# Patient Record
Sex: MALE | Race: WHITE | Employment: OTHER | URBAN - NONMETROPOLITAN AREA
[De-identification: names, ages, dates, MRNs, and addresses within clinical notes are randomized per-mention and may not be internally consistent; named-entity substitution may affect disease eponyms.]

---

## 2018-03-20 DIAGNOSIS — R21 RASH: ICD-10-CM

## 2018-03-20 RX ORDER — TRIAMCINOLONE ACETONIDE 1 MG/G
CREAM TOPICAL
Qty: 60 G | Refills: 1 | Status: SHIPPED | OUTPATIENT
Start: 2018-03-20 | End: 2018-03-27

## 2018-03-20 NOTE — TELEPHONE ENCOUNTER
"Requested Prescriptions   Pending Prescriptions Disp Refills     triamcinolone (KENALOG) 0.1 % cream 60 g 1    Topical Steroid Protocol Failed    3/20/2018  9:17 AM       Failed - Recent (12 mo) or future (30 days) visit within the authorizing provider's specialty    Patient had office visit in the last 12 months or has a visit in the next 30 days with authorizing provider or within the authorizing provider's specialty.  See \"Patient Info\" tab in inbasket, or \"Choose Columns\" in Meds & Orders section of the refill encounter.           Passed - Patient is age 6 or older       Passed - Authorizing prescriber's most recent note related to this medication read.       Passed - High potency steroid not ordered        Kenalog      Last Written Prescription Date:  12/1/14  Last Fill Quantity: 60,   # refills: 1  Last Office Visit: 9/26/16  Future Office visit:       Routing refill request to provider for review/approval because:  Drug not active on patient's medication list    "

## 2018-03-27 ENCOUNTER — OFFICE VISIT (OUTPATIENT)
Dept: FAMILY MEDICINE | Facility: OTHER | Age: 58
End: 2018-03-27
Payer: COMMERCIAL

## 2018-03-27 ENCOUNTER — RADIANT APPOINTMENT (OUTPATIENT)
Dept: GENERAL RADIOLOGY | Facility: OTHER | Age: 58
End: 2018-03-27
Attending: PHYSICIAN ASSISTANT
Payer: COMMERCIAL

## 2018-03-27 ENCOUNTER — TELEPHONE (OUTPATIENT)
Dept: FAMILY MEDICINE | Facility: CLINIC | Age: 58
End: 2018-03-27

## 2018-03-27 VITALS
HEIGHT: 74 IN | HEART RATE: 80 BPM | SYSTOLIC BLOOD PRESSURE: 126 MMHG | BODY MASS INDEX: 24.95 KG/M2 | OXYGEN SATURATION: 94 % | TEMPERATURE: 98.1 F | RESPIRATION RATE: 20 BRPM | DIASTOLIC BLOOD PRESSURE: 72 MMHG | WEIGHT: 194.4 LBS

## 2018-03-27 DIAGNOSIS — R68.89 FLU-LIKE SYMPTOMS: ICD-10-CM

## 2018-03-27 DIAGNOSIS — J20.9 ACUTE BRONCHITIS, UNSPECIFIED ORGANISM: Primary | ICD-10-CM

## 2018-03-27 DIAGNOSIS — R68.83 CHILLS: ICD-10-CM

## 2018-03-27 DIAGNOSIS — R05.9 COUGH: ICD-10-CM

## 2018-03-27 DIAGNOSIS — Z20.818 STREP THROAT EXPOSURE: ICD-10-CM

## 2018-03-27 LAB
DEPRECATED S PYO AG THROAT QL EIA: NORMAL
FLUAV+FLUBV AG SPEC QL: NEGATIVE
FLUAV+FLUBV AG SPEC QL: NEGATIVE
SPECIMEN SOURCE: NORMAL
SPECIMEN SOURCE: NORMAL

## 2018-03-27 PROCEDURE — 99214 OFFICE O/P EST MOD 30 MIN: CPT | Performed by: PHYSICIAN ASSISTANT

## 2018-03-27 PROCEDURE — 87880 STREP A ASSAY W/OPTIC: CPT | Performed by: PHYSICIAN ASSISTANT

## 2018-03-27 PROCEDURE — 87081 CULTURE SCREEN ONLY: CPT | Performed by: PHYSICIAN ASSISTANT

## 2018-03-27 PROCEDURE — 87804 INFLUENZA ASSAY W/OPTIC: CPT | Performed by: PHYSICIAN ASSISTANT

## 2018-03-27 PROCEDURE — 71046 X-RAY EXAM CHEST 2 VIEWS: CPT | Mod: FY

## 2018-03-27 RX ORDER — AZITHROMYCIN 250 MG/1
TABLET, FILM COATED ORAL
Qty: 6 TABLET | Refills: 0 | Status: SHIPPED | OUTPATIENT
Start: 2018-03-27 | End: 2020-10-07

## 2018-03-27 RX ORDER — CODEINE PHOSPHATE AND GUAIFENESIN 10; 100 MG/5ML; MG/5ML
1-2 SOLUTION ORAL EVERY 6 HOURS PRN
Qty: 120 ML | Refills: 0 | Status: SHIPPED | OUTPATIENT
Start: 2018-03-27 | End: 2020-10-07

## 2018-03-27 RX ORDER — METHYLPREDNISOLONE 4 MG
TABLET, DOSE PACK ORAL
Qty: 21 TABLET | Refills: 0 | Status: SHIPPED | OUTPATIENT
Start: 2018-03-27 | End: 2020-10-07

## 2018-03-27 ASSESSMENT — ANXIETY QUESTIONNAIRES
3. WORRYING TOO MUCH ABOUT DIFFERENT THINGS: NOT AT ALL
GAD7 TOTAL SCORE: 2
2. NOT BEING ABLE TO STOP OR CONTROL WORRYING: NOT AT ALL
1. FEELING NERVOUS, ANXIOUS, OR ON EDGE: SEVERAL DAYS
5. BEING SO RESTLESS THAT IT IS HARD TO SIT STILL: NOT AT ALL
6. BECOMING EASILY ANNOYED OR IRRITABLE: NOT AT ALL
IF YOU CHECKED OFF ANY PROBLEMS ON THIS QUESTIONNAIRE, HOW DIFFICULT HAVE THESE PROBLEMS MADE IT FOR YOU TO DO YOUR WORK, TAKE CARE OF THINGS AT HOME, OR GET ALONG WITH OTHER PEOPLE: NOT DIFFICULT AT ALL
7. FEELING AFRAID AS IF SOMETHING AWFUL MIGHT HAPPEN: NOT AT ALL

## 2018-03-27 ASSESSMENT — PAIN SCALES - GENERAL: PAINLEVEL: MILD PAIN (2)

## 2018-03-27 ASSESSMENT — PATIENT HEALTH QUESTIONNAIRE - PHQ9: 5. POOR APPETITE OR OVEREATING: SEVERAL DAYS

## 2018-03-27 NOTE — PATIENT INSTRUCTIONS

## 2018-03-27 NOTE — NURSING NOTE
"Chief Complaint   Patient presents with     Fever     last night     Generalized Body Aches     last night       Initial /72 (BP Location: Right arm, Patient Position: Chair, Cuff Size: Adult Large)  Pulse 80  Temp 98.1  F (36.7  C) (Oral)  Resp 20  Ht 6' 1.5\" (1.867 m)  Wt 194 lb 6.4 oz (88.2 kg)  SpO2 94%  BMI 25.3 kg/m2 Estimated body mass index is 25.3 kg/(m^2) as calculated from the following:    Height as of this encounter: 6' 1.5\" (1.867 m).    Weight as of this encounter: 194 lb 6.4 oz (88.2 kg).  Medication Reconciliation: mercedes STEVENSON LPN      "

## 2018-03-27 NOTE — TELEPHONE ENCOUNTER
"Reason for call:  Patient reporting a symptom    Symptom or request: Patient wants to discuss symptoms with a nurse to see if he should be seen or not, sore throat, fever, deep cough    Duration (how long have symptoms been present): \"started last night at midnight, hit like a ton of bricks\"    Have you been treated for this before? No    Additional comments:     Phone Number patient can be reached at:  Home number on file [phone] (home)    Best Time:      Can we leave a detailed message on this number:  YES    Call taken on 3/27/2018 at 11:40 AM by Lottie Browning    "

## 2018-03-27 NOTE — TELEPHONE ENCOUNTER
": 1960  PHONE #'s: [phone] (home)     PRESENTING PROBLEM:  Came down with shivers at midnight. Heavy cough. Body aches. HA. Neck feels sores. Also nauseated.  \" It has been 25 years since I have been sick  I am still in bed, I am so cold. .\"    NURSING ASSESSMENT  Description:  I cough so hard, I gagged at times.   Onset/duration:  Started last night at midnight.   Precip. factors:  Was around a grandson this past weekend that was Dx with strep.   Assoc. Sx:   Decreased appetite  Improves/worsens Sx:  Same   Pain scale (1-10)   3/10  Sx specific meds:  Ibuprofen 600 mg  Last exam/Tx:  Has NOT been seen for this.     RECOMMENDED DISPOSITION:  See in 24 hours -  Cornelia schedule with Berta Tse at 2 pm today per her OK  Will comply with recommendation: YES  If further questions/concerns or if Sx do not improve, worsen or new Sx develop, call your PCP or [facility] as soon as possible.    NOTES:  Disposition was determined by the first positive assessment question, therefore all previous assessment questions were negative.  Informed to check provider manual or call insurance company to assure coverage.    Guideline used: Fever, Adult/ Influenza  Telephone Triage Protocols for Nurses, Fifth Edition, Veronica Rondon RN  2018    "

## 2018-03-27 NOTE — MR AVS SNAPSHOT
After Visit Summary   3/27/2018    Sherwin Joshi    MRN: [id number]           Patient Information     Date Of Birth          1960        Visit Information        Provider Department      3/27/2018 3:40 PM Berta Tse PA-C [facility]        Today's Diagnoses     Acute bronchitis, unspecified organism    -  1    Flu-like symptoms        Strep throat exposure        Cough        Chills          Care Instructions      Acute Bronchitis  Your healthcare provider has told you that you have acute bronchitis. Bronchitis is infection or inflammation of the bronchial tubes (airways in the lungs). Normally, air moves easily in and out of the airways. Bronchitis narrows the airways, making it harder for air to flow in and out of the lungs. This causes symptoms such as shortness of breath, coughing up yellow or green mucus, and wheezing. Bronchitis can be acute or chronic. Acute means the condition comes on quickly and goes away in a short time, usually within 3 to 10 days. Chronic means a condition lasts a long time and often comes back.    What causes acute bronchitis?  Acute bronchitis almost always starts as a viral respiratory infection, such as a cold or the flu. Certain factors make it more likely for a cold or flu to turn into bronchitis. These include being very young, being elderly, having a heart or lung problem, or having a weak immune system. Cigarette smoking also makes bronchitis more likely.  When bronchitis develops, the airways become swollen. The airways may also become infected with bacteria. This is known as a secondary infection.  Diagnosing acute bronchitis  Your healthcare provider will examine you and ask about your symptoms and health history. You may also have a sputum culture to test the fluid in your lungs. Chest X-rays may be done to look for infection in the lungs.  Treating acute bronchitis  Bronchitis usually clears up as the cold or flu goes away. You can  help feel better faster by doing the following:    Take medicine as directed. You may be told to take ibuprofen or other over-the-counter medicines. These help relieve inflammation in your bronchial tubes. Your healthcare provider may prescribe an inhaler to help open up the bronchial tubes. Most of the time, acute bronchitis is caused by a viral infection. Antibiotics are usually not prescribed for viral infections.    Drink plenty of fluids, such as water, juice, or warm soup. Fluids loosen mucus so that you can cough it up. This helps you breathe more easily. Fluids also prevent dehydration.    Make sure you get plenty of rest.    Do not smoke. Do not allow anyone else to smoke in your home.  Recovery and follow-up  Follow up with your doctor as you are told. You will likely feel better in a week or two. But a dry cough can linger beyond that time. Let your doctor know if you still have symptoms (other than a dry cough) after 2 weeks, or if you re prone to getting bronchial infections. Take steps to protect yourself from future infections. These steps include stopping smoking and avoiding tobacco smoke, washing your hands often, and getting a yearly flu shot.  When to call your healthcare provider  Call the healthcare provider if you have any of the following:    Fever of 100.4 F (38.0 C) or higher, or as advised    Symptoms that get worse, or new symptoms    Trouble breathing    Symptoms that don t start to improve within a week, or within 3 days of taking antibiotics   Date Last Reviewed: 12/1/2016 2000-2017 [employer]. [address] [place] [address], [place] [address]. All rights reserved. This information is not intended as a substitute for professional medical care. Always follow your healthcare professional's instructions.                Follow-ups after your visit        Follow-up notes from your care team     Return if symptoms worsen or fail to improve.      Your next 10 appointments already  "scheduled     Mar 27, 2018  3:40 PM CDT   [facility] with Berta Tse [facility]-C   [facility]    [address] [facility] [id number] [phone]              Who to contact     If you have questions or need follow up information about today's clinic visit or your schedule please contact [facility] directly at [phone].  Normal or non-critical lab and imaging results will be communicated to you by [email or web address], letter or phone within 4 business days after the clinic has received the results. If you do not hear from us within 7 days, please contact the clinic through [email or web address] or phone. If you have a critical or abnormal lab result, we will notify you by phone as soon as possible.  Submit refill requests through [facility] or call your pharmacy and they will forward the refill request to us. Please allow 3 business days for your refill to be completed.          Additional Information About Your Visit        [employer] Information     [employer] lets you send messages to your doctor, view your test results, renew your prescriptions, schedule appointments and more. To sign up, go to [email or web address] . Click on \"Log in\" on the left side of the screen, which will take you to the Welcome page. Then click on \"Sign up Now\" on the right side of the page.     You will be asked to enter the access code listed below, as well as some personal information. Please follow the directions to create your username and password.     Your access code is: [email or web address]  Expires: 2018  2:49 PM     Your access code will  in 90 days. If you need help or a new code, please call your [facility] or [phone].        Care EveryWhere ID     This is your Care EveryWhere ID. This could be used by other organizations to access your [place] medical records  [id number]        Your Vitals Were     Pulse Temperature Respirations Height Pulse Oximetry BMI (Body Mass Index)    80 98.1 " " F (36.7  C) (Oral) 20 6' 1.5\" (1.867 m) 94% 25.3 kg/m2       Blood Pressure from Last 3 Encounters:   03/27/18 126/72   09/26/16 120/80   07/25/14 104/72    Weight from Last 3 Encounters:   03/27/18 194 lb 6.4 oz (88.2 kg)   10/27/16 199 lb (90.3 kg)   10/17/16 199 lb (90.3 kg)              We Performed the Following     Beta strep group A culture     Influenza A/B antigen     Strep, Rapid Screen          Today's Medication Changes          These changes are accurate as of 3/27/18  2:50 PM.  If you have any questions, ask your nurse or doctor.               Start taking these medicines.        Dose/Directions    azithromycin 250 MG tablet   Commonly known as:  ZITHROMAX   Used for:  Acute bronchitis, unspecified organism   Started by:  Berta Tse PA-C        Two tablets first day, then one tablet daily for four days.   Quantity:  6 tablet   Refills:  0       guaiFENesin-codeine 100-10 MG/5ML Soln solution   Commonly known as:  ROBITUSSIN AC   Used for:  Cough   Started by:  Berta Tse PA-C        Dose:  1-2 tsp.   Take 5-10 mLs by mouth every 6 hours as needed for cough   Quantity:  120 mL   Refills:  0       methylPREDNISolone 4 MG tablet   Commonly known as:  MEDROL DOSEPAK   Used for:  Acute bronchitis, unspecified organism   Started by:  Berta Tse PA-C        Follow package instructions   Quantity:  21 tablet   Refills:  0            Where to get your medicines      These medications were sent to [facility] [place], [place] - [address]   [address] [facility] [address]     Phone:  [phone]     azithromycin 250 MG tablet    methylPREDNISolone 4 MG tablet         Some of these will need a paper prescription and others can be bought over the counter.  Ask your nurse if you have questions.     Bring a paper prescription for each of these medications     guaiFENesin-codeine 100-10 MG/5ML Soln solution                Primary Care Provider Office Phone # Fax #    Sherwin CRAFT" MD [place] [phone]       [address] [place] [phone]        Equal Access to Services     LIDYA HILARIO : Hadii aad ku hadzanekenny Arnold, alvinaperla israelgiaha, doc kassiearmando green, isaias sue sairashiva alejandra laKierageraldo hidalgo. So [facility] [phone].    ATENCIÓN: Si habla español, tiene a cueto disposición servicios gratuitos de asistencia lingüística. Llame al [phone].    We comply with applicable federal civil rights laws and [place] laws. We do not discriminate on the basis of race, color, national origin, age, disability, sex, sexual orientation, or gender identity.            Thank you!     Thank you for choosing [facility]  for your care. Our goal is always to provide you with excellent care. Hearing back from our patients is one way we can continue to improve our services. Please take a few minutes to complete the written survey that you may receive in the mail after your visit with us. Thank you!             Your Updated Medication List - Protect others around you: Learn how to safely use, store and throw away your medicines at www.disposemymeds.org.          This list is accurate as of 3/27/18  2:50 PM.  Always use your most recent med list.                   Brand Name Dispense Instructions for use Diagnosis    azithromycin 250 MG tablet    ZITHROMAX    6 tablet    Two tablets first day, then one tablet daily for four days.    Acute bronchitis, unspecified organism       guaiFENesin-codeine 100-10 MG/5ML Soln solution    ROBITUSSIN AC    120 mL    Take 5-10 mLs by mouth every 6 hours as needed for cough    Cough       methylPREDNISolone 4 MG tablet    MEDROL DOSEPAK    21 tablet    Follow package instructions    Acute bronchitis, unspecified organism

## 2018-03-27 NOTE — PROGRESS NOTES
SUBJECTIVE:   Sherwin Joshi is a 57 year old male who presents to clinic today for the following health issues:      Acute Illness   Acute illness concerns: fever and body aches  Onset: last night    Fever: YES    Chills/Sweats: YES    Headache (location?): YES    Sinus Pressure:no    Conjunctivitis:  no    Ear Pain: no    Rhinorrhea: no    Congestion: YES    Sore Throat: no     Cough: YES-productive of green sputum    Wheeze: YES    Decreased Appetite: YES    Nausea: YES    Vomiting: no    Diarrhea:  no    Dysuria/Freq.: no    Fatigue/Achiness: YES    Sick/Strep Exposure: YES     Therapies Tried and outcome: Ibuprofen, slight relief    Patient reports that he started having a cough about a week ago which came in fits a few times but then seemed to resolve. He was exposed to strep through a family member recently but has not had much of a sore throat. He states that last evening he woke feeling chilled and having body aches and a severe cough that was productive of green sputum. He has continued to have symptoms today. Cough is made worse with deep breathing. He has not had any wheezing, he has no history of lung disease.     -------------------------------------    Problem list and histories reviewed & adjusted, as indicated.  Additional history: as documented    BP Readings from Last 3 Encounters:   03/27/18 126/72   09/26/16 120/80   07/25/14 104/72    Wt Readings from Last 3 Encounters:   03/27/18 194 lb 6.4 oz (88.2 kg)   10/27/16 199 lb (90.3 kg)   10/17/16 199 lb (90.3 kg)                    Reviewed and updated as needed this visit by clinical staff  Tobacco  Allergies  Med Hx  Surg Hx  Fam Hx  Soc Hx      Reviewed and updated as needed this visit by Provider         ROS:  Constitutional, HEENT, cardiovascular, pulmonary, gi and gu systems are negative, except as otherwise noted.    OBJECTIVE:     /72 (BP Location: Right arm, Patient Position: Chair, Cuff Size: Adult Large)  Pulse 80  Temp  "98.1  F (36.7  C) (Oral)  Resp 20  Ht 6' 1.5\" (1.867 m)  Wt 194 lb 6.4 oz (88.2 kg)  SpO2 94%  BMI 25.3 kg/m2  Body mass index is 25.3 kg/(m^2).  GENERAL: healthy, alert and no distress  HENT: normal cephalic/atraumatic, ear canals and TM's normal, rhinorrhea yellow, oropharynx clear, oral mucous membranes moist and tonsillar erythema  NECK: bilateral anterior cervical adenopathy  RESP: bronchial breath sounds mild bilaterally  HEART: RRR, no M/R/G  Skin: no rashes or lesions     Diagnostic Test Results:  Results for orders placed or performed in visit on 03/27/18 (from the past 24 hour(s))   Influenza A/B antigen   Result Value Ref Range    Influenza A/B Agn Specimen Nose     Influenza A Negative NEG^Negative    Influenza B Negative NEG^Negative   Strep, Rapid Screen   Result Value Ref Range    Specimen Description Throat     Rapid Strep A Screen       NEGATIVE: No Group A streptococcal antigen detected by immunoassay, await culture report.     Xray-pending     ASSESSMENT/PLAN:       ICD-10-CM    1. Acute bronchitis, unspecified organism J20.9 azithromycin (ZITHROMAX) 250 MG tablet     methylPREDNISolone (MEDROL DOSEPAK) 4 MG tablet   2. Flu-like symptoms R68.89 Influenza A/B antigen     Beta strep group A culture   3. Strep throat exposure Z20.818 Strep, Rapid Screen     CANCELED: Beta strep group A culture   4. Cough R05 XR Chest 2 Views     guaiFENesin-codeine (ROBITUSSIN AC) 100-10 MG/5ML SOLN solution   5. Chills R68.83 XR Chest 2 Views       I will treat for acute bronchitis vs. Early pneumonia with a z pack and a medrol pack as well as some cough syrup. He should follow up if not improving or if new or worsening symptoms.   See Patient Instructions    Berta Tse PA-C  [facility]    "

## 2018-03-28 LAB
BACTERIA SPEC CULT: NORMAL
SPECIMEN SOURCE: NORMAL

## 2018-03-28 ASSESSMENT — PATIENT HEALTH QUESTIONNAIRE - PHQ9: SUM OF ALL RESPONSES TO PHQ QUESTIONS 1-9: 0

## 2018-03-28 ASSESSMENT — ANXIETY QUESTIONNAIRES: GAD7 TOTAL SCORE: 2

## 2019-12-08 ENCOUNTER — HEALTH MAINTENANCE LETTER (OUTPATIENT)
Age: 59
End: 2019-12-08

## 2020-09-08 ENCOUNTER — HOSPITAL ENCOUNTER (EMERGENCY)
Facility: CLINIC | Age: 60
Discharge: HOME | End: 2020-09-08
Attending: EMERGENCY MEDICINE | Admitting: EMERGENCY MEDICINE
Payer: COMMERCIAL

## 2020-09-08 DIAGNOSIS — S01.81XA FACIAL LACERATION, INITIAL ENCOUNTER: ICD-10-CM

## 2020-09-08 PROCEDURE — 99283 EMERGENCY DEPT VISIT LOW MDM: CPT | Mod: 25 | Performed by: EMERGENCY MEDICINE

## 2020-09-08 PROCEDURE — 25000128 H RX IP 250 OP 636: Performed by: EMERGENCY MEDICINE

## 2020-09-08 PROCEDURE — 12013 RPR F/E/E/N/L/M 2.6-5.0 CM: CPT | Mod: Z6 | Performed by: EMERGENCY MEDICINE

## 2020-09-08 PROCEDURE — 12013 RPR F/E/E/N/L/M 2.6-5.0 CM: CPT | Performed by: EMERGENCY MEDICINE

## 2020-09-08 PROCEDURE — 25000125 ZZHC RX 250: Performed by: EMERGENCY MEDICINE

## 2020-09-08 PROCEDURE — 90471 IMMUNIZATION ADMIN: CPT | Performed by: EMERGENCY MEDICINE

## 2020-09-08 PROCEDURE — 90715 TDAP VACCINE 7 YRS/> IM: CPT | Performed by: EMERGENCY MEDICINE

## 2020-09-08 PROCEDURE — 99282 EMERGENCY DEPT VISIT SF MDM: CPT | Mod: 25 | Performed by: EMERGENCY MEDICINE

## 2020-09-08 RX ORDER — LIDOCAINE HYDROCHLORIDE AND EPINEPHRINE 10; 10 MG/ML; UG/ML
5 INJECTION, SOLUTION INFILTRATION; PERINEURAL ONCE
Status: COMPLETED | OUTPATIENT
Start: 2020-09-08 | End: 2020-09-08

## 2020-09-08 RX ADMIN — CLOSTRIDIUM TETANI TOXOID ANTIGEN (FORMALDEHYDE INACTIVATED), CORYNEBACTERIUM DIPHTHERIAE TOXOID ANTIGEN (FORMALDEHYDE INACTIVATED), BORDETELLA PERTUSSIS TOXOID ANTIGEN (GLUTARALDEHYDE INACTIVATED), BORDETELLA PERTUSSIS FILAMENTOUS HEMAGGLUTININ ANTIGEN (FORMALDEHYDE INACTIVATED), BORDETELLA PERTUSSIS PERTACTIN ANTIGEN, AND BORDETELLA PERTUSSIS FIMBRIAE 2/3 ANTIGEN 0.5 ML: 5; 2; 2.5; 5; 3; 5 INJECTION, SUSPENSION INTRAMUSCULAR at 18:02

## 2020-09-08 RX ADMIN — LIDOCAINE HYDROCHLORIDE,EPINEPHRINE BITARTRATE 5 ML: 10; .01 INJECTION, SOLUTION INFILTRATION; PERINEURAL at 17:49

## 2020-09-08 NOTE — ED TRIAGE NOTES
Fell about 1615 today and sustained a laceration above his right eyebrow.   Patient states he fell 8 foot metal roof, landed on his feet and rolled. No complaints of any other injuries. Dr. Vides in room and notified.   Patient's airway, breathing, circulation, and disability/mental status (ABCDs) intact/WDL during triage.

## 2020-09-08 NOTE — ED PROVIDER NOTES
History     Chief Complaint   Patient presents with     Facial Laceration     The history is provided by the patient.     Sherwin Joshi is a 59 year old male who presents to the emergency department for evaluation of a facial laceration. The patient was on a pole barn roof prior to arrival and says he fell off. He fell from about eight feet in the air and landed onto his feet and rolled. He says the ground was soft. A piece of tin flipped up and hit him in the right side of his face when he landed. He says he has some stinging at the site of the laceration, but denies any pain. He was ambulatory after falling and has not developed any pain in his feet or other extremities. He says his tetanus is not up to date.     Allergies:  Allergies   Allergen Reactions     No Known Drug Allergies        Problem List:    Patient Active Problem List    Diagnosis Date Noted     Strain of piriformis muscle, left, initial encounter 10/27/2016     Priority: Medium     Hyperlipidemia LDL goal <130 09/12/2012     Priority: Medium     CARDIOVASCULAR SCREENING; LDL GOAL LESS THAN 160 01/24/2011     Priority: Medium     Molluscum contagiosum 08/08/2007     Priority: Medium     Sprain of ankle 02/01/2002     Priority: Medium     left, work related.  Problem list name updated by automated process. Provider to review          Past Medical History:    History reviewed. No pertinent past medical history.    Past Surgical History:    Past Surgical History:   Procedure Laterality Date     C NONSPECIFIC PROCEDURE      R upper arm Hematoma surgery     C NONSPECIFIC PROCEDURE  1973    ORIF elbow     COLONOSCOPY  10/22/2012    Procedure: COLONOSCOPY;  Colonoscopy;  Surgeon: Chandana Fink MD;  Location:  GI      LAPAROSCOPY, SURGICAL; APPENDECTOMY  04/24/10      REPAIR ING HERNIA,5+Y/O,REDUCIBL  1997    Left inguinal hernia repair with Marlex mesn     HC VASECTOMY UNILAT/BILAT W POSTOP SEMEN  1997    Vasectomy       Family History:     No family history on file.    Social History:  Marital Status:   [2]  Social History     Tobacco Use     Smoking status: Never Smoker     Smokeless tobacco: Never Used   Substance Use Topics     Alcohol use: No     Drug use: No        Medications:    azithromycin (ZITHROMAX) 250 MG tablet  guaiFENesin-codeine (ROBITUSSIN AC) 100-10 MG/5ML SOLN solution  methylPREDNISolone (MEDROL DOSEPAK) 4 MG tablet          Review of Systems   All other systems reviewed and are negative.      Physical Exam   BP: (!) 163/106  Pulse: 60  Temp: 97.9  F (36.6  C)  Resp: 18  Weight: 91.6 kg (201 lb 14.4 oz)  SpO2: 97 %      Physical Exam  Vitals signs and nursing note reviewed.   Constitutional:       General: He is not in acute distress.     Appearance: Normal appearance. He is not diaphoretic.   HENT:      Head: Normocephalic. Laceration (4 cm stellate laceration lateral to the right eye. Eye is not involved.) present.      Comments: No step off or crepitus.       Right Ear: External ear normal.      Left Ear: External ear normal.   Eyes:      Extraocular Movements: Extraocular movements intact.      Conjunctiva/sclera: Conjunctivae normal.      Pupils: Pupils are equal, round, and reactive to light.   Neck:      Musculoskeletal: Normal range of motion and neck supple. No neck rigidity.   Cardiovascular:      Rate and Rhythm: Normal rate and regular rhythm.      Pulses: Normal pulses.      Heart sounds: Normal heart sounds. No murmur.   Pulmonary:      Effort: Pulmonary effort is normal. No respiratory distress.      Breath sounds: Normal breath sounds.   Chest:      Chest wall: No tenderness.   Abdominal:      General: Bowel sounds are normal.      Palpations: Abdomen is soft.      Tenderness: There is no abdominal tenderness. There is no guarding.   Musculoskeletal: Normal range of motion.         General: No deformity.      Cervical back: He exhibits no tenderness.      Thoracic back: He exhibits no tenderness.       Lumbar back: He exhibits no tenderness.      Comments: All extremities move well without signs of injury.   Skin:     General: Skin is warm and dry.      Coloration: Skin is not pale.      Findings: No rash.   Neurological:      Mental Status: He is alert and oriented to person, place, and time.      Motor: No weakness.   Psychiatric:         Thought Content: Thought content normal.         Judgment: Judgment normal.         ED Course        [facility]    -Laceration Repair    Date/Time: 9/8/2020 8:29 PM  Performed by: Bird Vides MD  Authorized by: Bird Vides MD       ANESTHESIA (see MAR for exact dosages):     Anesthesia method:  Local infiltration    Local anesthetic:  Lidocaine 1% WITH epi  LACERATION DETAILS     Location:  Face    Length (cm):  4    REPAIR TYPE:     Repair type:  Intermediate      EXPLORATION:     Wound exploration: wound explored through full range of motion and entire depth of wound probed and visualized      Contaminated: no      TREATMENT:     Area cleansed with:  Saline    SKIN REPAIR     Repair method:  Sutures    Suture size:  5-0    Suture material:  Nylon    Number of sutures:  6    APPROXIMATION     Approximation:  Close    POST-PROCEDURE DETAILS     Dressing:  Antibiotic ointment      PROCEDURE   Patient Tolerance:  Patient tolerated the procedure well with no immediate complications                     Critical Care time:  none               No results found for this or any previous visit (from the past 24 hour(s)).    Medications   Tdap (tetanus-diphtheria-acell pertussis) (ADACEL) injection 0.5 mL (0.5 mLs Intramuscular Given 9/8/20 8192)   lidocaine 1% with EPINEPHrine 1:100,000 injection 5 mL (5 mLs Intradermal Given by Other 9/8/20 8312)       Assessments & Plan (with Medical Decision Making)  59 year old male who presents with concerns of a facial laceration. This occurred prior to arrival after the patient fell about eight feet  from a roof to the ground. Upon arrival he is afebrile. His blood pressure is slightly elevated at 163/106, but he is otherwise in stable condition. Examination revealed a 4 cm stellate laceration lateral to the right eye. The right eye is not involved. He has no other injuries and complains of no pain. All extremities move normally. He is ambulatory.   The patient's laceration was repaired here in the ED. See the dictated procedure above for the full report. His tetanus vaccination was also updated. I explained that he should have the sutures removed in 4-6 days. If he feels comfortable doing this at home he can, otherwise he can follow up in the clinic. He should keep the area clean and dry. He can take Tylenol or ibuprofen for any pain he should have. If he notices any signs of infection we discussed he should return to the ED. The patient is in agreement with this treatment plan and is suitable for discharge in stable condition.      I have reviewed the nursing notes.    I have reviewed the findings, diagnosis, plan and need for follow up with the patient.       Discharge Medication List as of 9/8/2020  6:31 PM          Final diagnoses:   Facial laceration, initial encounter       This document serves as a record of services personally performed by Bird Vides MD. It was created on their behalf by Vanessa Morris, a trained medical scribe. The creation of this record is based on the provider's personal observations and the statements of the patient. This document has been checked and approved by the attending provider.  Note: Chart documentation done in part with Dragon Voice Recognition software. Although reviewed after completion, some word and grammatical errors may remain.  9/8/2020   [facility] EMERGENCY DEPARTMENT     Bird Vides MD  09/08/20 2030

## 2020-09-08 NOTE — ED AVS SNAPSHOT
[facility] Emergency Department  [address] [place] [address]  Phone:  [phone]  Fax:  [phone]                                    Sherwin Joshi   MRN: [id number]    Department:  [facility] Emergency Department   Date of Visit:  9/8/2020           After Visit Summary Signature Page    I have received my discharge instructions, and my questions have been answered. I have discussed any challenges I see with this plan with the nurse or doctor.    ..........................................................................................................................................  Patient/Patient Representative Signature      ..........................................................................................................................................  Patient Representative Print Name and Relationship to Patient    ..................................................               ................................................  Date                                   Time    ..........................................................................................................................................  Reviewed by Signature/Title    ...................................................              ..............................................  Date                                               Time          22EPIC Rev 08/18

## 2020-09-09 VITALS
RESPIRATION RATE: 18 BRPM | WEIGHT: 201.9 LBS | SYSTOLIC BLOOD PRESSURE: 166 MMHG | DIASTOLIC BLOOD PRESSURE: 110 MMHG | HEART RATE: 69 BPM | TEMPERATURE: 97.9 F | OXYGEN SATURATION: 97 % | BODY MASS INDEX: 26.28 KG/M2

## 2020-09-14 ENCOUNTER — ALLIED HEALTH/NURSE VISIT (OUTPATIENT)
Dept: FAMILY MEDICINE | Facility: CLINIC | Age: 60
End: 2020-09-14
Payer: COMMERCIAL

## 2020-09-14 VITALS — DIASTOLIC BLOOD PRESSURE: 62 MMHG | SYSTOLIC BLOOD PRESSURE: 120 MMHG

## 2020-09-14 DIAGNOSIS — Z48.02 VISIT FOR SUTURE REMOVAL: Primary | ICD-10-CM

## 2020-09-14 PROCEDURE — 99207 ZZC NO CHARGE NURSE ONLY: CPT

## 2020-09-14 NOTE — PROGRESS NOTES
Sherwin Joshi presents to the clinic today for removal of sutures.  The patient has had the sutures in place for 6 days.  There has been no history of infection or drainage.  7 sutures are seen located on.  The wound is healing well with no signs of infection.  Tetanus status is up to date.   All sutures were easily removed today.  Routine wound care discussed.  The patient will follow up as needed.    Viki Delacruz RN

## 2020-10-01 ENCOUNTER — TELEPHONE (OUTPATIENT)
Dept: FAMILY MEDICINE | Facility: CLINIC | Age: 60
End: 2020-10-01

## 2020-10-01 NOTE — TELEPHONE ENCOUNTER
"Aroldo is requesting a refill of a cream that he uses on his face, he did not have a bottle to read me the name of the medication, and I could not see \"past\" prescriptions in Epic, only current prescriptions.  Sorry, Hopefully you can find what he is requesting.     Thanks  Lidia Maldonado [facility]   [phone]      "

## 2020-10-07 ENCOUNTER — OFFICE VISIT (OUTPATIENT)
Dept: FAMILY MEDICINE | Facility: CLINIC | Age: 60
End: 2020-10-07
Payer: COMMERCIAL

## 2020-10-07 VITALS
TEMPERATURE: 98.4 F | WEIGHT: 198.4 LBS | SYSTOLIC BLOOD PRESSURE: 134 MMHG | HEIGHT: 73 IN | OXYGEN SATURATION: 98 % | BODY MASS INDEX: 26.29 KG/M2 | HEART RATE: 82 BPM | RESPIRATION RATE: 16 BRPM | DIASTOLIC BLOOD PRESSURE: 80 MMHG

## 2020-10-07 DIAGNOSIS — R21 RASH: ICD-10-CM

## 2020-10-07 PROCEDURE — 99213 OFFICE O/P EST LOW 20 MIN: CPT | Performed by: FAMILY MEDICINE

## 2020-10-07 RX ORDER — TRIAMCINOLONE ACETONIDE 1 MG/G
CREAM TOPICAL
Qty: 60 G | Refills: 1 | Status: SHIPPED | OUTPATIENT
Start: 2020-10-07

## 2020-10-07 ASSESSMENT — MIFFLIN-ST. JEOR: SCORE: 1773.82

## 2020-10-07 ASSESSMENT — PAIN SCALES - GENERAL: PAINLEVEL: NO PAIN (0)

## 2020-10-07 NOTE — NURSING NOTE
Health Maintenance Due   Topic Date Due     HEPATITIS C SCREENING  1960     ADVANCE CARE PLANNING  1960     HIV SCREENING  12/04/1976     ZOSTER IMMUNIZATION (1 of 2) 12/04/2011     PREVENTIVE CARE VISIT  07/25/2015     LIPID  01/07/2020     INFLUENZA VACCINE (1) 09/01/2020     Health Maintenance reviewed at today's visit patient asked to schedule/complete:   Immunizations:  Patient declined     Eric Nolasco, CMA

## 2020-10-07 NOTE — PROGRESS NOTES
"Subjective     Sherwin Joshi is a 59 year old male who presents to clinic today for the following health issues:    HPI         Concern - derm  Onset: comes and goes  Description: red spots on face  Intensity: moderate  Progression of Symptoms:  intermittent  Accompanying Signs & Symptoms: red, itches and burns, flakes  Previous history of similar problem: He has had it for 6 years or so  Precipitating factors:        Worsened by: stress makes it worse (he believes)  Alleviating factors:        Improved by: prescription ( triamcinolone cream)  Therapies tried and outcome: triamcinolone cream from physician    SUBJECTIVE:  Sherwin  is a 59 year old male who presents for: Rash on his face.  He has had this on and off.  Comes and goes.  [place] flakes.  He said it for about 6 years.  Triamcinolone cream seems to work great.  He is out of it now.    No past medical history on file.  Past Surgical History:   Procedure Laterality Date     COLONOSCOPY  10/22/2012    Procedure: COLONOSCOPY;  Colonoscopy;  Surgeon: Chandana Fink MD;  Location:  GI     HC LAPAROSCOPY, SURGICAL; APPENDECTOMY  04/24/10      REPAIR ING HERNIA,5+Y/O,REDUCIBL  1997    Left inguinal hernia repair with Marlex mesn      VASECTOMY UNILAT/BILAT W POSTOP SEMEN  1997    Vasectomy     [facility] NONSPECIFIC PROCEDURE      R upper arm Hematoma surgery     [facility] NONSPECIFIC PROCEDURE  1973    ORIF elbow     Social History     Tobacco Use     Smoking status: Never Smoker     Smokeless tobacco: Never Used   Substance Use Topics     Alcohol use: No     Current Outpatient Medications   Medication Sig Dispense Refill     triamcinolone (KENALOG) 0.1 % external cream APPLY TOPICALLY TWO TIMES A DAY 60 g 1       REVIEW OF SYSTEMS:   5 point ROS negative except as noted above in HPI, including Gen., Resp, CV, GI &  system review.     OBJECTIVE:  Vitals: /80   Pulse 82   Temp 98.4  F (36.9  C) (Temporal)   Resp 16   Ht 1.854 m (6' 1\")   Wt 90 kg (198 " lb 6.4 oz)   SpO2 98%   BMI 26.18 kg/m    BMI= Body mass index is 26.18 kg/m .  He is alert and appears well.  Little bit of redness on the chin below the lips both sides of his face.  Flat red a little bit of scaling.  Not well demarcated.    ASSESSMENT:  Rash    PLAN:  We will treat with triamcinolone 0.1% cream he uses this as needed it seems to clear up after a couple of days when it comes on.  Not sure what brings this on.        Sherwin aAron MD  [facility]

## 2021-01-09 ENCOUNTER — HEALTH MAINTENANCE LETTER (OUTPATIENT)
Age: 61
End: 2021-01-09

## 2021-04-21 ENCOUNTER — IMMUNIZATION (OUTPATIENT)
Dept: FAMILY MEDICINE | Facility: CLINIC | Age: 61
End: 2021-04-21
Payer: COMMERCIAL

## 2021-04-21 PROCEDURE — 0011A PR COVID VAC MODERNA 100 MCG/0.5 ML IM: CPT

## 2021-04-21 PROCEDURE — 91301 PR COVID VAC MODERNA 100 MCG/0.5 ML IM: CPT

## 2021-05-19 ENCOUNTER — IMMUNIZATION (OUTPATIENT)
Dept: FAMILY MEDICINE | Facility: CLINIC | Age: 61
End: 2021-05-19
Attending: FAMILY MEDICINE
Payer: COMMERCIAL

## 2021-05-19 PROCEDURE — 91301 PR COVID VAC MODERNA 100 MCG/0.5 ML IM: CPT

## 2021-05-19 PROCEDURE — 0012A PR COVID VAC MODERNA 100 MCG/0.5 ML IM: CPT

## 2021-10-23 ENCOUNTER — HEALTH MAINTENANCE LETTER (OUTPATIENT)
Age: 61
End: 2021-10-23

## 2022-01-14 ENCOUNTER — IMMUNIZATION (OUTPATIENT)
Dept: FAMILY MEDICINE | Facility: CLINIC | Age: 62
End: 2022-01-14
Payer: COMMERCIAL

## 2022-01-14 PROCEDURE — 0064A COVID-19,PF,MODERNA (18+ YRS BOOSTER .25ML): CPT

## 2022-01-14 PROCEDURE — 91306 COVID-19,PF,MODERNA (18+ YRS BOOSTER .25ML): CPT

## 2022-02-12 ENCOUNTER — HEALTH MAINTENANCE LETTER (OUTPATIENT)
Age: 62
End: 2022-02-12

## 2022-10-09 ENCOUNTER — HEALTH MAINTENANCE LETTER (OUTPATIENT)
Age: 62
End: 2022-10-09

## 2023-03-25 ENCOUNTER — HEALTH MAINTENANCE LETTER (OUTPATIENT)
Age: 63
End: 2023-03-25

## 2024-08-03 ENCOUNTER — HEALTH MAINTENANCE LETTER (OUTPATIENT)
Age: 64
End: 2024-08-03

## 2025-07-24 ENCOUNTER — OFFICE VISIT (OUTPATIENT)
Dept: FAMILY MEDICINE | Facility: OTHER | Age: 65
End: 2025-07-24
Payer: COMMERCIAL

## 2025-07-24 VITALS
HEIGHT: 74 IN | OXYGEN SATURATION: 96 % | WEIGHT: 212.5 LBS | TEMPERATURE: 97.8 F | HEART RATE: 66 BPM | DIASTOLIC BLOOD PRESSURE: 88 MMHG | SYSTOLIC BLOOD PRESSURE: 126 MMHG | BODY MASS INDEX: 27.27 KG/M2 | RESPIRATION RATE: 16 BRPM

## 2025-07-24 DIAGNOSIS — N52.9 ERECTILE DYSFUNCTION, UNSPECIFIED ERECTILE DYSFUNCTION TYPE: ICD-10-CM

## 2025-07-24 DIAGNOSIS — Z56.9 WORK ENVIRONMENT ADVERSE EFFECT: ICD-10-CM

## 2025-07-24 DIAGNOSIS — R35.0 BENIGN PROSTATIC HYPERPLASIA WITH URINARY FREQUENCY: ICD-10-CM

## 2025-07-24 DIAGNOSIS — R21 RASH: ICD-10-CM

## 2025-07-24 DIAGNOSIS — L98.9 SKIN LESION: ICD-10-CM

## 2025-07-24 DIAGNOSIS — Z00.00 ROUTINE GENERAL MEDICAL EXAMINATION AT A HEALTH CARE FACILITY: Primary | ICD-10-CM

## 2025-07-24 DIAGNOSIS — Z12.5 SCREENING FOR PROSTATE CANCER: ICD-10-CM

## 2025-07-24 DIAGNOSIS — Z12.11 SCREEN FOR COLON CANCER: ICD-10-CM

## 2025-07-24 DIAGNOSIS — E78.5 HYPERLIPIDEMIA LDL GOAL <130: ICD-10-CM

## 2025-07-24 DIAGNOSIS — N40.1 BENIGN PROSTATIC HYPERPLASIA WITH URINARY FREQUENCY: ICD-10-CM

## 2025-07-24 LAB
CHOLEST SERPL-MCNC: 207 MG/DL
FASTING STATUS PATIENT QL REPORTED: YES
FASTING STATUS PATIENT QL REPORTED: YES
GLUCOSE SERPL-MCNC: 104 MG/DL (ref 70–99)
HDLC SERPL-MCNC: 50 MG/DL
LDLC SERPL CALC-MCNC: 126 MG/DL
NONHDLC SERPL-MCNC: 157 MG/DL
PSA SERPL DL<=0.01 NG/ML-MCNC: 3.17 NG/ML (ref 0–4.5)
TRIGL SERPL-MCNC: 153 MG/DL

## 2025-07-24 RX ORDER — TADALAFIL 10 MG/1
TABLET ORAL
Qty: 30 TABLET | Refills: 2 | Status: SHIPPED | OUTPATIENT
Start: 2025-07-24

## 2025-07-24 RX ORDER — TRIAMCINOLONE ACETONIDE 1 MG/G
CREAM TOPICAL
Qty: 60 G | Refills: 1 | Status: SHIPPED | OUTPATIENT
Start: 2025-07-24

## 2025-07-24 SDOH — ECONOMIC STABILITY - INCOME SECURITY: UNSPECIFIED PROBLEMS RELATED TO EMPLOYMENT: Z56.9

## 2025-07-24 SDOH — HEALTH STABILITY: PHYSICAL HEALTH: ON AVERAGE, HOW MANY DAYS PER WEEK DO YOU ENGAGE IN MODERATE TO STRENUOUS EXERCISE (LIKE A BRISK WALK)?: 0 DAYS

## 2025-07-24 SDOH — HEALTH STABILITY: PHYSICAL HEALTH: ON AVERAGE, HOW MANY MINUTES DO YOU ENGAGE IN EXERCISE AT THIS LEVEL?: 0 MIN

## 2025-07-24 ASSESSMENT — SOCIAL DETERMINANTS OF HEALTH (SDOH): HOW OFTEN DO YOU GET TOGETHER WITH FRIENDS OR RELATIVES?: TWICE A WEEK

## 2025-07-24 NOTE — PATIENT INSTRUCTIONS
Patient Education   Preventive Care Advice   This is general advice given by our system to help you stay healthy. However, your care team may have specific advice just for you. Please talk to your care team about your preventive care needs.  Nutrition  Eat 5 or more servings of fruits and vegetables each day.  Try wheat bread, brown rice and whole grain pasta (instead of white bread, rice, and pasta).  Get enough calcium and vitamin D. Check the label on foods and aim for 100% of the RDA (recommended daily allowance).  Lifestyle  Exercise at least 150 minutes each week  (30 minutes a day, 5 days a week).  Do muscle strengthening activities 2 days a week. These help control your weight and prevent disease.  No smoking.  Wear sunscreen to prevent skin cancer.  Have a dental exam and cleaning every 6 months.  Yearly exams  See your health care team every year to talk about:  Any changes in your health.  Any medicines your care team has prescribed.  Preventive care, family planning, and ways to prevent chronic diseases.  Shots (vaccines)   HPV shots (up to age 26), if you've never had them before.  Hepatitis B shots (up to age 59), if you've never had them before.  COVID-19 shot: Get this shot when it's due.  Flu shot: Get a flu shot every year.  Tetanus shot: Get a tetanus shot every 10 years.  Pneumococcal, hepatitis A, and RSV shots: Ask your care team if you need these based on your risk.  Shingles shot (for age 50 and up)  General health tests  Diabetes screening:  Starting at age 35, Get screened for diabetes at least every 3 years.  If you are younger than age 35, ask your care team if you should be screened for diabetes.  Cholesterol test: At age 39, start having a cholesterol test every 5 years, or more often if advised.  Bone density scan (DEXA): At age 50, ask your care team if you should have this scan for osteoporosis (brittle bones).  Hepatitis C: Get tested at least once in your life.  STIs (sexually  transmitted infections)  Before age 24: Ask your care team if you should be screened for STIs.  After age 24: Get screened for STIs if you're at risk. You are at risk for STIs (including HIV) if:  You are sexually active with more than one person.  You don't use condoms every time.  You or a partner was diagnosed with a sexually transmitted infection.  If you are at risk for HIV, ask about PrEP medicine to prevent HIV.  Get tested for HIV at least once in your life, whether you are at risk for HIV or not.  Cancer screening tests  Cervical cancer screening: If you have a cervix, begin getting regular cervical cancer screening tests starting at age 21.  Breast cancer scan (mammogram): If you've ever had breasts, begin having regular mammograms starting at age 40. This is a scan to check for breast cancer.  Colon cancer screening: It is important to start screening for colon cancer at age 45.  Have a colonoscopy test every 10 years (or more often if you're at risk) Or, ask your provider about stool tests like a FIT test every year or Cologuard test every 3 years.  To learn more about your testing options, visit:   .  For help making a decision, visit:   https://bit.ly/op36200.  Prostate cancer screening test: If you have a prostate, ask your care team if a prostate cancer screening test (PSA) at age 55 is right for you.  Lung cancer screening: If you are a current or former smoker ages 50 to 80, ask your care team if ongoing lung cancer screenings are right for you.  For informational purposes only. Not to replace the advice of your health care provider. Copyright   2023 [employer]. All rights reserved. Clinically reviewed by the [facility] Transitions Program. [employer] [id number] - REV 01/24.

## 2025-07-24 NOTE — PROGRESS NOTES
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 He says that he reported a net loss this past year for taxes and was switched to medicaid [employer]. As such he has had to transition cares as he was told his dentist and urologist are no longer in network. Reviewed healthy lifestyle recommendations with the patient. Reviewed health maintenance and updated per the patient's preferences.  - Lipid panel reflex to direct LDL Non-fasting; Future  - Glucose; Future  - Lipid panel reflex to direct LDL Non-fasting  - Glucose    Rash  Patient has been using triamcinolone to manage atopic dermatitis which will flare on occasion. Previous PCP had started him on topical steroid to use as needed. Patient feels that this has been effective at managing his rash. Reviewed good skin cares.   - triamcinolone (KENALOG) 0.1 % external cream; APPLY TOPICALLY TWO TIMES A DAY    Skin lesion  Patient has a small lesion along the left posteromedial lower leg. This is slightly raised with central erythema. No scaling, nontender. This does not appear to be fungal. Has not improved with the topical steroid. I offered to treat this with cryotherapy today. Patient was agreeable to this plan, treatedx3 without complication. If not improving as expected patient will reach out for dermatology referral.     Hyperlipidemia LDL goal <130  Patient is due for lipid panel today. I will update this today and notify him of the results. Reviewed the importance of a balanced diet and regular exercise. Patient plans to make more efforts towards these recommendations over the next year.   - Lipid panel reflex to direct LDL Non-fasting; Future  - Lipid panel reflex to direct LDL Non-fasting    Benign prostatic hyperplasia with urinary frequency  Erectile  "dysfunction, unspecified erectile dysfunction type  Patient had been working with urologist to address suspected BPH with urinary frequency and erectile dysfunction. He was prescribed cialis 20mg to use once daily as needed. He says that this has been effectively managing the ED, but he will experience lingering headaches and fatigue for several days after use. His urologist had recommended transitioning to daily use, but patient was apprehensive about doing this given the adverse effects he has already experienced. We discussed trial of lower dose, 10mg daily PRN, vs dropping to 2.5mg or 5mg daily dosing. He would like to try the 10mg daily as needed and will monitor to see if adverse effects persist.   - tadalafil (CIALIS) 10 MG tablet; Take 1 tab (10mg) daily as needed (sexual activity)    Work environment adverse effect  Patient informs me that he has worked with sand blasting and insulation installation for 20years. He is concerned about possible damage to his lungs given exposure to potential respiratory irritants over this period of time. I reviewed CXR from 2018, unremarkable. He would like to see if CT scan would be covered. I will put in an order for him. Advised that he confirm coverage/cost with insurance prior to completing.   - CT Chest w/o Contrast; Future    Screen for colon cancer  Agreeable to ordering being placed today. He will schedule at his convenience.   - Colonoscopy Screening  Referral; Future    Screening for prostate cancer  Reviewed the limitations of this lab. I will notify him of the results.  - PSA, screen; Future  - PSA, screen    Patient has been advised of split billing requirements and indicates understanding: Yes    BMI  Estimated body mass index is 27.66 kg/m  as calculated from the following:    Height as of this encounter: 1.867 m (6' 1.5\").    Weight as of this encounter: 96.4 kg (212 lb 8 oz).   Weight management plan: Discussed healthy diet and exercise " guidelines    Counseling  Appropriate preventive services were addressed with this patient via screening, questionnaire, or discussion as appropriate for fall prevention, nutrition, physical activity, Tobacco-use cessation, social engagement, weight loss and cognition.  Checklist reviewing preventive services available has been given to the patient.  Reviewed patient's diet, addressing concerns and/or questions.   The patient was instructed to see the dentist every 6 months.   Reviewed preventive health counseling, as reflected in patient instructions       Regular exercise       Healthy diet/nutrition       Colorectal cancer screening       Consider prostate cancer screening (age 55-69)    Magnolia Kendrick is a 64 year old, presenting for the following:  Physical        7/24/2025     7:11 AM   Additional Questions   Roomed by Jaye JUAREZ   Accompanied by self         7/24/2025     7:11 AM   Patient Reported Additional Medications   Patient reports taking the following new medications Add PRN medication Tadalafil 20 MG 1 tab every other day PRN          HPI He has been a [facility] patient he reports and his previous PCP retired so needing to find a new PCP.  Side effects from Tadalafil he was feeling groggy the next day and getting headaches, he had asked that provider for a lower dose but she had said no and offered for him to take it everyday and he said no he didn't want to do that.      A small dot on his left calf he has had for a long time, maybe 5-6 years it has been coming and going. He heard about a common condition or skin cancer thing that someone on the news had gone through when finding a spot on his leg, he said this story was about Magan Harris on the news.    Advance Care Planning  Discussed advance care planning with patient; however, patient declined at this time.        7/24/2025   General Health   How would you rate your overall physical health? Good   Feel stress (tense, anxious, or unable to sleep) Only a  little   (!) STRESS CONCERN      7/24/2025   Nutrition   Three or more servings of calcium each day? Yes   Diet: I don't know   How many servings of fruit and vegetables per day? (!) 0-1   How many sweetened beverages each day? (!) 3         7/24/2025   Exercise   Days per week of moderate/strenous exercise 0 days   Average minutes spent exercising at this level 0 min   (!) EXERCISE CONCERN      7/24/2025   Social Factors   Frequency of gathering with friends or relatives Twice a week   Worry food won't last until get money to buy more No   Food not last or not have enough money for food? No   Do you have housing? (Housing is defined as stable permanent housing and does not include staying outside in a car, in a tent, in an abandoned building, in an overnight shelter, or couch-surfing.) Yes   Are you worried about losing your housing? No   Lack of transportation? No   Unable to get utilities (heat,electricity)? No         7/24/2025   Fall Risk   Fallen 2 or more times in the past year? No   Trouble with walking or balance? No          7/24/2025   Dental   Dentist two times every year? (!) NO         Today's PHQ-2 Score:       7/24/2025     6:27 AM   PHQ-2 ( 1999 [employer])   Q1: Little interest or pleasure in doing things 0   Q2: Feeling down, depressed or hopeless 0   PHQ-2 Score 0    Q1: Little interest or pleasure in doing things Not at all   Q2: Feeling down, depressed or hopeless Not at all   PHQ-2 Score 0       Patient-reported           7/24/2025   Substance Use   Alcohol more than 3/day or more than 7/wk No   Do you use any other substances recreationally? No     Social History     Tobacco Use    Smoking status: Never    Smokeless tobacco: Never   Vaping Use    Vaping status: Never Used   Substance Use Topics    Alcohol use: No    Drug use: No             7/24/2025   One time HIV Screening   Previous HIV test? No         7/24/2025   STI Screening   New sexual partner(s) since last STI/HIV test? No   Last PSA:  "  PSA   Date Value Ref Range Status   01/07/2015 0.8 ng/mL Final     ASCVD Risk   The ASCVD Risk score (Andre DK, et al., 2019) failed to calculate for the following reasons:    Cannot find a previous HDL lab    Cannot find a previous total cholesterol lab     Reviewed and updated as needed this visit by Provider                    No past medical history on file.      Review of Systems  Constitutional, HEENT, cardiovascular, pulmonary, gi and gu systems are negative, except as otherwise noted.     Objective    Exam  /88   Pulse 66   Temp 97.8  F (36.6  C) (Temporal)   Resp 16   Ht 1.867 m (6' 1.5\")   Wt 96.4 kg (212 lb 8 oz)   SpO2 96%   BMI 27.66 kg/m     Estimated body mass index is 27.66 kg/m  as calculated from the following:    Height as of this encounter: 1.867 m (6' 1.5\").    Weight as of this encounter: 96.4 kg (212 lb 8 oz).    Physical Exam  GENERAL: alert and no distress  EYES: Eyes grossly normal to inspection, PERRL and conjunctivae and sclerae normal  HENT: ear canals and TM's normal, nose and mouth without ulcers or lesions  NECK: no adenopathy, no asymmetry, masses, or scars  RESP: lungs clear to auscultation - no rales, rhonchi or wheezes  CV: regular rate and rhythm, normal S1 S2, no S3 or S4, no murmur, click or rub, no peripheral edema  ABDOMEN: soft, nontender, no hepatosplenomegaly, no masses and bowel sounds normal  MS: no gross musculoskeletal defects noted, no edema  SKIN: small raised lesion with central erythema along the left posteromedial lower leg   PSYCH: mentation appears normal, affect normal/bright    Signed Electronically by: Rey Dias PA-C    "